# Patient Record
Sex: FEMALE | Race: WHITE | NOT HISPANIC OR LATINO | Employment: FULL TIME | URBAN - METROPOLITAN AREA
[De-identification: names, ages, dates, MRNs, and addresses within clinical notes are randomized per-mention and may not be internally consistent; named-entity substitution may affect disease eponyms.]

---

## 2021-01-15 DIAGNOSIS — Z23 ENCOUNTER FOR IMMUNIZATION: ICD-10-CM

## 2021-02-10 ENCOUNTER — IMMUNIZATIONS (OUTPATIENT)
Dept: FAMILY MEDICINE CLINIC | Facility: HOSPITAL | Age: 31
End: 2021-02-10

## 2021-02-10 DIAGNOSIS — Z23 ENCOUNTER FOR IMMUNIZATION: Primary | ICD-10-CM

## 2021-02-10 PROCEDURE — 91300 SARS-COV-2 / COVID-19 MRNA VACCINE (PFIZER-BIONTECH) 30 MCG: CPT

## 2021-02-10 PROCEDURE — 0001A SARS-COV-2 / COVID-19 MRNA VACCINE (PFIZER-BIONTECH) 30 MCG: CPT

## 2021-03-05 ENCOUNTER — IMMUNIZATIONS (OUTPATIENT)
Dept: FAMILY MEDICINE CLINIC | Facility: HOSPITAL | Age: 31
End: 2021-03-05

## 2021-03-05 DIAGNOSIS — Z23 ENCOUNTER FOR IMMUNIZATION: Primary | ICD-10-CM

## 2021-03-05 PROCEDURE — 91300 SARS-COV-2 / COVID-19 MRNA VACCINE (PFIZER-BIONTECH) 30 MCG: CPT

## 2021-03-05 PROCEDURE — 0002A SARS-COV-2 / COVID-19 MRNA VACCINE (PFIZER-BIONTECH) 30 MCG: CPT

## 2021-03-09 PROBLEM — I83.819 PAIN DUE TO VARICOSE VEINS OF LOWER EXTREMITY: Status: ACTIVE | Noted: 2021-03-09

## 2021-03-09 PROBLEM — G47.33 OBSTRUCTIVE SLEEP APNEA SYNDROME: Status: ACTIVE | Noted: 2021-03-09

## 2021-03-09 PROBLEM — J30.9 ALLERGIC RHINITIS: Status: ACTIVE | Noted: 2021-03-09

## 2021-03-09 PROBLEM — F41.8 MIXED ANXIETY AND DEPRESSIVE DISORDER: Status: ACTIVE | Noted: 2021-03-09

## 2021-03-09 PROBLEM — E78.2 MIXED HYPERLIPIDEMIA: Status: ACTIVE | Noted: 2021-03-09

## 2021-03-09 PROBLEM — E66.01 MORBID OBESITY (HCC): Status: ACTIVE | Noted: 2021-03-09

## 2021-03-09 NOTE — PROGRESS NOTES
Assessment/Plan   Diagnoses and all orders for this visit:    Well woman exam  -     Hemoglobin Electrophoresis; Future  -     Liquid-based pap, screening    Oligomenorrhea, unspecified type  -     Antimullerian hormone (AMH); Future  -     Comprehensive metabolic panel; Future  -     DHEA-sulfate; Future  -     Follicle stimulating hormone; Future  -     Luteinizing hormone; Future  -     Testosterone, free, total; Future  -     Insulin, fasting; Future  -     T4, free; Future  -     TSH, 3rd generation; Future  -     Estradiol; Future  -     Progesterone; Future  -     hCG, quantitative; Future  -     HEMOGLOBIN A1C W/ EAG ESTIMATION; Future    Screening for genetic disease carrier status  -     SMA Carrier Screen; Future  -     Cystic fibrosis gene test; Future    Other orders  -     DULoxetine HCl 40 MG CPEP  -     cetirizine (ZyrTEC) 10 mg tablet; Take 10 mg by mouth daily        Discussion    All questions have been answered to her satisfaction  RTO for APE or sooner if needed      Subjective     HPI   Charu Brennan is a 27 y o  female who presents for annual well woman exam    Menarche - 15; LMP -12/20/20   Periods q  days  Will occas have a run of normal periods then will miss a few mths  Periods very heavy and crampy 1-2 days Last 5 days  Has used Provera previously for withdrawal bleeds  I r/with pt risks of endometrial thickeneing and uterine cancer with long term oligomenorrhea with >90 days in between cycles  Was previously given diagnosis of PCOS years ago but was not a fan of her previous doc and did not feel like she had diagnosis explained to her very well  We reviewed diagnostic criteria for PCOS and that it is a syndrome, not a disease and that not everryone has the same components  Pt does admit to hirsutism and acne  Pt and  just stopped using BC last month  They do desire pregnancy  No vulvar itch/burn; No vaginal itch/burn; No abn discharge or odor;  No urinary sx - burning/pain/frequency/hematuria    (+) SBEs - no breast masses, asymmetry, nipple discharge or bleeding, changes in skin of breast, or breast tenderness bilaterally    No abd/pelvic pain or HAs;     Pt is sexually active in a mutually monog/ sexual relationship-1 lifetime partner; No issues with intercourse; She declines std/hiv/hep testing; Feels safe at home      (+) PCP for routine Bw/care; Last Pap - 2017   History of abnormal Pap smear: denies     Review of Systems   Constitutional: Negative  Respiratory: Negative  Gastrointestinal: Negative  Endocrine: Negative  Genitourinary: Positive for menstrual problem  The following portions of the patient's history were reviewed and updated as appropriate: allergies, current medications, past family history, past medical history, past social history, past surgical history and problem list          OB History    No obstetric history on file  Past Medical History:   Diagnosis Date    Asthma 2000    allergies    Depression 2005    Migraine 2003    Polycystic ovary syndrome 2012       History reviewed  No pertinent surgical history      Family History   Problem Relation Age of Onset    Asthma Mother    Mitali Fredi Migraines Mother    [address] [place] Mother     Osteoarthritis Mother     Osteoporosis Mother     Breast cancer Paternal Grandmother        Social History     Socioeconomic History    Marital status: /Civil Union     Spouse name: Not on file    Number of children: Not on file    Years of education: Not on file    Highest education level: Not on file   Occupational History    Not on file   Social Needs    Financial resource strain: Not on file    Food insecurity     Worry: Not on file     Inability: Not on file   [employer] needs     Medical: Not on file     Non-medical: Not on file   Tobacco Use    Smoking status: Never Smoker    Smokeless tobacco: Never Used   Substance and Sexual Activity    Alcohol use: Yes     Alcohol/week: 2 0 standard drinks     Types: 2 Glasses of wine per week    Drug use: Never    Sexual activity: Yes     Partners: Male     Birth control/protection: Coitus interruptus, Condom Male   Lifestyle    Physical activity     Days per week: Not on file     Minutes per session: Not on file    Stress: Not on file   Relationships    Social connections     Talks on phone: Not on file     Gets together: Not on file     Attends Denominational service: Not on file     Active member of club or organization: Not on file     Attends meetings of clubs or organizations: Not on file     Relationship status: Not on file    Intimate partner violence     Fear of current or ex partner: Not on file     Emotionally abused: Not on file     Physically abused: Not on file     Forced sexual activity: Not on file   Other Topics Concern    Not on file   Social History Narrative    Not on file         Current Outpatient Medications:     cetirizine (ZyrTEC) 10 mg tablet, Take 10 mg by mouth daily, Disp: , Rfl:     DULoxetine HCl 40 MG CPEP, , Disp: , Rfl:     No Known Allergies    Objective   Vitals:    03/10/21 0842   BP: 136/82   BP Location: Left arm   Patient Position: Sitting   Cuff Size: Standard   Weight: 121 kg (267 lb)   Height: 5' 4" (1 626 m)     Physical Exam  Constitutional:       Appearance: She is well-developed  HENT:      Head: Normocephalic and atraumatic  Cardiovascular:      Rate and Rhythm: Normal rate and regular rhythm  Pulmonary:      Effort: Pulmonary effort is normal       Breath sounds: Normal breath sounds  Chest:      Breasts: Breasts are symmetrical          Right: No inverted nipple, mass, nipple discharge, skin change or tenderness  Left: No inverted nipple, mass, nipple discharge, skin change or tenderness  Abdominal:      General: There is no distension  Palpations: Abdomen is soft  There is no mass  Tenderness: There is no guarding or rebound  Genitourinary:     Exam position: Supine  Labia:         Right: No rash  Left: No rash  Vagina: No signs of injury and foreign body  No vaginal discharge or erythema  Cervix: No cervical motion tenderness  Adnexa:         Right: No mass  Left: No mass  Rectum: Guaiac result negative  Skin:     General: Skin is warm and dry  Neurological:      Mental Status: She is alert and oriented to person, place, and time  Patient Instructions   Pap done  Declines STD work up  At this point will plan fasting labs  If no evidence of ovulation, will plan Provera withdrawal    Will then plan day 3 labs  Pt and  just began to try for pregnancy, likely not ready for any ovulation induction meds yet  At the end of the appt pt did mention that her mom has a h/o + anticardiolipin antibodies  She had a still born child  Will plan to add KASSIDY antibodies in with her day 3 labs once her cycle comes on

## 2021-03-10 ENCOUNTER — OFFICE VISIT (OUTPATIENT)
Dept: OBGYN CLINIC | Facility: CLINIC | Age: 31
End: 2021-03-10
Payer: COMMERCIAL

## 2021-03-10 VITALS
HEIGHT: 64 IN | SYSTOLIC BLOOD PRESSURE: 136 MMHG | BODY MASS INDEX: 45.58 KG/M2 | WEIGHT: 267 LBS | DIASTOLIC BLOOD PRESSURE: 82 MMHG

## 2021-03-10 DIAGNOSIS — N91.5 OLIGOMENORRHEA, UNSPECIFIED TYPE: ICD-10-CM

## 2021-03-10 DIAGNOSIS — Z13.71 SCREENING FOR GENETIC DISEASE CARRIER STATUS: ICD-10-CM

## 2021-03-10 DIAGNOSIS — Z01.419 WELL WOMAN EXAM: Primary | ICD-10-CM

## 2021-03-10 PROCEDURE — 3008F BODY MASS INDEX DOCD: CPT | Performed by: PHYSICIAN ASSISTANT

## 2021-03-10 PROCEDURE — 99385 PREV VISIT NEW AGE 18-39: CPT | Performed by: PHYSICIAN ASSISTANT

## 2021-03-10 PROCEDURE — 87624 HPV HI-RISK TYP POOLED RSLT: CPT | Performed by: PHYSICIAN ASSISTANT

## 2021-03-10 PROCEDURE — G0145 SCR C/V CYTO,THINLAYER,RESCR: HCPCS | Performed by: PHYSICIAN ASSISTANT

## 2021-03-10 RX ORDER — CETIRIZINE HYDROCHLORIDE 10 MG/1
10 TABLET ORAL DAILY
COMMUNITY

## 2021-03-10 RX ORDER — DULOXETINE 40 MG/1
CAPSULE, DELAYED RELEASE ORAL
COMMUNITY
Start: 2020-04-01 | End: 2021-05-28 | Stop reason: ALTCHOICE

## 2021-03-10 NOTE — PATIENT INSTRUCTIONS
Pap done  Declines STD work up  At this point will plan fasting labs  If no evidence of ovulation, will plan Provera withdrawal    Will then plan day 3 labs  Pt and  just began to try for pregnancy, likely not ready for any ovulation induction meds yet  At the end of the appt pt did mention that her mom has a h/o + anticardiolipin antibodies  She had a still born child  Will plan to add KASSIDY antibodies in with her day 3 labs once her cycle comes on

## 2021-03-12 LAB
HPV HR 12 DNA CVX QL NAA+PROBE: NEGATIVE
HPV16 DNA CVX QL NAA+PROBE: NEGATIVE
HPV18 DNA CVX QL NAA+PROBE: NEGATIVE

## 2021-03-16 LAB
ALBUMIN SERPL-MCNC: 3.8 G/DL (ref 3.6–5.1)
ALBUMIN/GLOB SERPL: 1.6 (CALC) (ref 1–2.5)
ALP SERPL-CCNC: 73 U/L (ref 31–125)
ALT SERPL-CCNC: 18 U/L (ref 6–29)
AST SERPL-CCNC: 14 U/L (ref 10–30)
B-HCG SERPL-ACNC: <3 MIU/ML
BILIRUB SERPL-MCNC: 0.4 MG/DL (ref 0.2–1.2)
BUN SERPL-MCNC: 11 MG/DL (ref 7–25)
BUN/CREAT SERPL: ABNORMAL (CALC) (ref 6–22)
CALCIUM SERPL-MCNC: 8.7 MG/DL (ref 8.6–10.2)
CHLORIDE SERPL-SCNC: 103 MMOL/L (ref 98–110)
CO2 SERPL-SCNC: 27 MMOL/L (ref 20–32)
CREAT SERPL-MCNC: 0.72 MG/DL (ref 0.5–1.1)
DHEA-S SERPL-MCNC: 261 MCG/DL (ref 18–391)
EST. AVERAGE GLUCOSE BLD GHB EST-MCNC: 97 (CALC)
EST. AVERAGE GLUCOSE BLD GHB EST-SCNC: 5.4 (CALC)
ESTRADIOL SERPL-MCNC: 51 PG/ML
FSH SERPL-ACNC: 6.2 MIU/ML
GLOBULIN SER CALC-MCNC: 2.4 G/DL (CALC) (ref 1.9–3.7)
GLUCOSE SERPL-MCNC: 102 MG/DL (ref 65–99)
HBA1C MFR BLD: 5 % OF TOTAL HGB
INSULIN SERPL-ACNC: 21 UIU/ML
LAB AP GYN PRIMARY INTERPRETATION: NORMAL
LH SERPL-ACNC: 9 MIU/ML
Lab: NORMAL
MIS SERPL-MCNC: 3.67 NG/ML (ref 0.69–13.39)
POTASSIUM SERPL-SCNC: 4.5 MMOL/L (ref 3.5–5.3)
PROGEST SERPL-MCNC: 0.7 NG/ML
PROT SERPL-MCNC: 6.2 G/DL (ref 6.1–8.1)
SL AMB EGFR AFRICAN AMERICAN: 130 ML/MIN/1.73M2
SL AMB EGFR NON AFRICAN AMERICAN: 112 ML/MIN/1.73M2
SODIUM SERPL-SCNC: 138 MMOL/L (ref 135–146)
T4 FREE SERPL-MCNC: 0.8 NG/DL (ref 0.8–1.8)
TESTOST FREE SERPL-MCNC: 5.9 PG/ML (ref 0.1–6.4)
TESTOST SERPL-MCNC: 28 NG/DL (ref 2–45)
TSH SERPL-ACNC: 1.4 MIU/L

## 2021-03-17 DIAGNOSIS — N91.4 SECONDARY OLIGOMENORRHEA: Primary | ICD-10-CM

## 2021-03-17 RX ORDER — MEDROXYPROGESTERONE ACETATE 10 MG/1
10 TABLET ORAL DAILY
Qty: 10 TABLET | Refills: 0 | Status: SHIPPED | OUTPATIENT
Start: 2021-03-17 | End: 2021-05-28 | Stop reason: SDUPTHER

## 2021-03-31 ENCOUNTER — TELEPHONE (OUTPATIENT)
Dept: OBGYN CLINIC | Facility: CLINIC | Age: 31
End: 2021-03-31

## 2021-03-31 NOTE — TELEPHONE ENCOUNTER
I would have her finish provera if she's just spotting at this point  If bleeding picks up she can stop it and then plan day 3 labs 3 days after the first day of regular flow

## 2021-03-31 NOTE — TELEPHONE ENCOUNTER
Pt called to confirm when to complete BW  Pt started spotting today and started taking Provera 10 mg 3/24/21 still has two days left, have not miss a dose  Spotting is very light - here and there  0

## 2021-04-06 ENCOUNTER — TELEPHONE (OUTPATIENT)
Dept: OBGYN CLINIC | Facility: CLINIC | Age: 31
End: 2021-04-06

## 2021-04-06 NOTE — TELEPHONE ENCOUNTER
LMOM with details to Review with pt first day of full bleed is considered day one  Sunday pt had spotting and yesterday 4/5/21 was a full bleed

## 2021-04-08 LAB
ESTRADIOL SERPL-MCNC: 42 PG/ML
FSH SERPL-ACNC: 6.9 MIU/ML
LH SERPL-ACNC: 6.7 MIU/ML
PROGEST SERPL-MCNC: <0.5 NG/ML

## 2021-05-28 ENCOUNTER — OFFICE VISIT (OUTPATIENT)
Dept: FAMILY MEDICINE CLINIC | Facility: CLINIC | Age: 31
End: 2021-05-28
Payer: COMMERCIAL

## 2021-05-28 VITALS
TEMPERATURE: 97.6 F | OXYGEN SATURATION: 98 % | RESPIRATION RATE: 14 BRPM | DIASTOLIC BLOOD PRESSURE: 86 MMHG | BODY MASS INDEX: 46.06 KG/M2 | HEART RATE: 86 BPM | HEIGHT: 64 IN | SYSTOLIC BLOOD PRESSURE: 120 MMHG | WEIGHT: 269.8 LBS

## 2021-05-28 DIAGNOSIS — E66.01 CLASS 3 SEVERE OBESITY DUE TO EXCESS CALORIES WITHOUT SERIOUS COMORBIDITY WITH BODY MASS INDEX (BMI) OF 45.0 TO 49.9 IN ADULT (HCC): ICD-10-CM

## 2021-05-28 DIAGNOSIS — F41.8 MIXED ANXIETY AND DEPRESSIVE DISORDER: ICD-10-CM

## 2021-05-28 DIAGNOSIS — R13.10 DYSPHAGIA, UNSPECIFIED TYPE: ICD-10-CM

## 2021-05-28 DIAGNOSIS — E88.81 INSULIN RESISTANCE: Primary | ICD-10-CM

## 2021-05-28 DIAGNOSIS — E78.2 MIXED HYPERLIPIDEMIA: ICD-10-CM

## 2021-05-28 PROCEDURE — 3008F BODY MASS INDEX DOCD: CPT | Performed by: FAMILY MEDICINE

## 2021-05-28 PROCEDURE — 1036F TOBACCO NON-USER: CPT | Performed by: FAMILY MEDICINE

## 2021-05-28 PROCEDURE — 99204 OFFICE O/P NEW MOD 45 MIN: CPT | Performed by: FAMILY MEDICINE

## 2021-05-28 PROCEDURE — 3725F SCREEN DEPRESSION PERFORMED: CPT | Performed by: FAMILY MEDICINE

## 2021-05-28 RX ORDER — METFORMIN HYDROCHLORIDE 500 MG/1
500 TABLET, EXTENDED RELEASE ORAL
Qty: 30 TABLET | Refills: 3 | Status: SHIPPED | OUTPATIENT
Start: 2021-05-28 | End: 2021-08-24 | Stop reason: ALTCHOICE

## 2021-05-28 RX ORDER — DULOXETIN HYDROCHLORIDE 20 MG/1
40 CAPSULE, DELAYED RELEASE ORAL DAILY
Start: 2021-05-28 | End: 2021-07-21 | Stop reason: SDUPTHER

## 2021-05-28 RX ORDER — DULOXETIN HYDROCHLORIDE 20 MG/1
20 CAPSULE, DELAYED RELEASE ORAL DAILY
COMMUNITY
Start: 2021-05-14 | End: 2021-05-28 | Stop reason: SDUPTHER

## 2021-05-28 RX ORDER — MEDROXYPROGESTERONE ACETATE 5 MG/1
10 TABLET ORAL DAILY
COMMUNITY
Start: 2021-03-17 | End: 2021-07-20

## 2021-05-28 NOTE — PROGRESS NOTES
Assessment/Plan:    No problem-specific Assessment & Plan notes found for this encounter  Diagnoses and all orders for this visit:    Insulin resistance  Comments:  discussed options  start metformin xr 500mg daily  pursue medically managed wieght loss  Orders:  -     metFORMIN (GLUCOPHAGE-XR) 500 mg 24 hr tablet; Take 1 tablet (500 mg total) by mouth daily with breakfast  -     Ambulatory referral to Weight Management; Future    Dysphagia, unspecified type  Comments:  refer to GI  needs endoscopy  Orders:  -     Ambulatory referral to Gastroenterology; Future    Class 3 severe obesity due to excess calories without serious comorbidity with body mass index (BMI) of 45 0 to 49 9 in [facility])  Comments:  start metformin for insulin resistance and because she desires pregnancy  refer to medically managed weight loss  Orders:  -     Ambulatory referral to Weight Management; Future    Mixed anxiety and depressive disorder  Comments:  pt reports doing well with cymbalta 40mg   continue same    Orders:  -     DULoxetine (CYMBALTA) 20 mg capsule; Take 2 capsules (40 mg total) by mouth daily 2 capsules    Mixed hyperlipidemia  Comments:  ldl high, trigs high, hdl low  no role for statin at present  weight loss will help this issue    Other orders  -     Discontinue: DULoxetine (CYMBALTA) 20 mg capsule; Take 20 mg by mouth daily 2 capsules  -     medroxyPROGESTERone (PROVERA) 5 mg tablet; Take 10 mg by mouth daily        Subjective:      Patient ID: Emmanuel Torres is a 27 y o  female  HPI   Pt presents as new pt  Trying and failing to lose weight  Struggling with infertility and ? Of PCOS  Recent fasting insulin elevated  Seeing gyn    Lipids from 12/20 showed tchol 262, hdl 41, trigs 177, ldl 186    cmp shows glucose 102 but otherwise unremarkable  nml thyroid fxn  Pt states she has a hx of dysphagia for the last 6 yrs    Food sometimes gets stuck when she is swallowing and she has to focus at times to swallow  Had swallowing study in the past which was normal   Never had EGD    Pt has hx of anxiety and depression  She feels like cymbalta 40mg daily is really helpful in this regard  Feels optimized at present  The following portions of the patient's history were reviewed and updated as appropriate: allergies, current medications, past family history, past medical history, past social history, past surgical history and problem list     Review of Systems   Constitutional: Negative for chills, fatigue, fever and unexpected weight change  HENT: Positive for trouble swallowing  Negative for congestion, ear pain, hearing loss, postnasal drip, rhinorrhea, sinus pressure, sinus pain, sore throat and voice change  Eyes: Negative for pain, redness and visual disturbance  Respiratory: Negative for cough and shortness of breath  Cardiovascular: Negative for chest pain, palpitations and leg swelling  Gastrointestinal: Negative for abdominal pain, constipation, diarrhea and nausea  Endocrine: Negative for cold intolerance, heat intolerance, polydipsia and polyuria  Genitourinary: Negative for dysuria, frequency and urgency  Musculoskeletal: Negative for arthralgias, joint swelling and myalgias  Skin: Negative for rash  No suspicious lesions   Neurological: Negative for weakness, numbness and headaches  Hematological: Negative for adenopathy  Objective:      /86   Pulse 86   Temp 97 6 °F (36 4 °C)   Resp 14   Ht 5' 4" (1 626 m)   Wt 122 kg (269 lb 12 8 oz)   SpO2 98%   BMI 46 31 kg/m²          Physical Exam  Constitutional:       General: She is not in acute distress  Appearance: She is well-developed  She is obese  HENT:      Head: Normocephalic and atraumatic        Right Ear: Tympanic membrane, ear canal and external ear normal       Left Ear: Tympanic membrane, ear canal and external ear normal       Nose: Nose normal       Mouth/Throat:      Pharynx: No oropharyngeal exudate  Eyes:      Conjunctiva/sclera: Conjunctivae normal       Pupils: Pupils are equal, round, and reactive to light  Neck:      Thyroid: No thyromegaly  Vascular: No carotid bruit or JVD  Cardiovascular:      Rate and Rhythm: Regular rhythm  Heart sounds: S1 normal and S2 normal  No murmur  No friction rub  No gallop  No S3 or S4 sounds  Pulmonary:      Effort: Pulmonary effort is normal       Breath sounds: Normal breath sounds  No wheezing, rhonchi or rales  Abdominal:      General: Bowel sounds are normal  There is no distension  Palpations: Abdomen is soft  Tenderness: There is no abdominal tenderness  Lymphadenopathy:      Cervical: No cervical adenopathy  Neurological:      Mental Status: She is alert and oriented to person, place, and time  Cranial Nerves: No cranial nerve deficit  Sensory: No sensory deficit  Deep Tendon Reflexes: Reflexes are normal and symmetric  BMI Counseling: Body mass index is 46 31 kg/m²  The BMI is above normal  Nutrition recommendations include encouraging healthy choices of fruits and vegetables  Exercise recommendations include exercising 3-5 times per week  Pharmacotherapy was ordered to help aid in weight loss  Patient referred to weight management due to patient being overweight

## 2021-07-20 ENCOUNTER — TELEPHONE (OUTPATIENT)
Dept: OBGYN CLINIC | Facility: CLINIC | Age: 31
End: 2021-07-20

## 2021-07-20 ENCOUNTER — CONSULT (OUTPATIENT)
Dept: BARIATRICS | Facility: CLINIC | Age: 31
End: 2021-07-20
Payer: COMMERCIAL

## 2021-07-20 VITALS
DIASTOLIC BLOOD PRESSURE: 90 MMHG | RESPIRATION RATE: 12 BRPM | HEART RATE: 85 BPM | WEIGHT: 267.4 LBS | TEMPERATURE: 100.3 F | SYSTOLIC BLOOD PRESSURE: 120 MMHG | BODY MASS INDEX: 44.55 KG/M2 | HEIGHT: 65 IN

## 2021-07-20 DIAGNOSIS — E28.2 PCOS (POLYCYSTIC OVARIAN SYNDROME): ICD-10-CM

## 2021-07-20 DIAGNOSIS — G47.33 OBSTRUCTIVE SLEEP APNEA SYNDROME: ICD-10-CM

## 2021-07-20 DIAGNOSIS — F41.8 MIXED ANXIETY AND DEPRESSIVE DISORDER: ICD-10-CM

## 2021-07-20 DIAGNOSIS — E66.01 MORBID OBESITY (HCC): ICD-10-CM

## 2021-07-20 DIAGNOSIS — E78.2 MIXED HYPERLIPIDEMIA: ICD-10-CM

## 2021-07-20 DIAGNOSIS — E88.81 INSULIN RESISTANCE: ICD-10-CM

## 2021-07-20 DIAGNOSIS — E66.01 OBESITY, CLASS III, BMI 40-49.9 (MORBID OBESITY) (HCC): Primary | ICD-10-CM

## 2021-07-20 PROCEDURE — 1036F TOBACCO NON-USER: CPT | Performed by: PHYSICIAN ASSISTANT

## 2021-07-20 PROCEDURE — 99204 OFFICE O/P NEW MOD 45 MIN: CPT | Performed by: PHYSICIAN ASSISTANT

## 2021-07-20 NOTE — PROGRESS NOTES
Assessment/Plan: Morbid obesity ([facility] )  -Discussed options of HealthyCORE-Intensive Lifestyle Intervention Program, Very Low Calorie Diet-VLCD, Conservative Program, Heather-En-Y Gastric Bypass and Vertical Sleeve Gastrectomy and the role of weight loss medications   -Initial weight loss goal of 5-10% weight loss for improved health  -Screening labs: reviewed  -Patient is interested in pursuing Conservative Program and bundle with RD  Sample meal replacements given as well    Mixed anxiety and depressive disorder  -reports well controlled on Cymbalta    Mixed hyperlipidemia  Update lipid panel    PCOS (polycystic ovarian syndrome)  -being evaluated by GYN  -recently started on Metformin by PCP for insulin resistance  -dicussed important of dietary/lifestyle changes  -can consider increase metformin/addition of GLP-1  Patient defers increase of metformin dose at this time    Obstructive sleep apnea syndrome  -mild per patient report and mouth piece was recommended  -advise tx of this  -discussed risks of untreated ANJALI and difficulty with weight loss if not treated    Goals:    Food log (ie ) www myfitnesspal com,sparkpeople  com,loseit com,calorieking  com,etc    No sugary beverages  At least 64oz of water daily  Increase physical activity by 10 minutes daily  Gradually increase physical activity to a goal of 5 days per week for 30 minutes of MODERATE intensity PLUS 2 days per week of FULL BODY resistance training    Follow up in approximately 1 week with Non-Surgical Dietician and 6-8 weeks with PA-C    Diagnoses and all orders for this visit:    Obesity, Class III, BMI 40-49 9 (morbid obesity) ([facility] )  -     Lipid panel;  Future    BMI 45 0-49 9, adult ([facility])  Comments:  start metformin for insulin resistance and because she desires pregnancy  refer to medically managed weight loss  Orders:  -     Ambulatory referral to Weight Management    Insulin resistance  Comments:  discussed options  start metformin xr 500mg daily  pursue medically managed wieght loss  Orders:  -     Ambulatory referral to Weight Management  -     Lipid panel; Future    Morbid obesity ([facility])    Mixed anxiety and depressive disorder    Mixed hyperlipidemia    PCOS (polycystic ovarian syndrome)    Obstructive sleep apnea syndrome    Other orders  -     Prenatal Vit-DSS-Fe Cbn-FA (PRENATAL AD PO); Take by mouth          Subjective:   Chief Complaint   Patient presents with    Consult     for MWM        Patient ID: Trevor Travis  is a 27 y o  female with excess weight/obesity here to pursue weight managment  Past Medical History:   Diagnosis Date    Asthma 2000    allergies    Depression 2005    Migraine 2003    Morbid obesity with BMI of 45 0-49 9, adult ([facility] )     Polycystic ovary syndrome 2012         HPI:  Obesity/Excess Weight:  Severity: Severe  Onset:  Age 7  Modifiers: personal training at gym, interval eating  Contributing factors: traving with work in last year, feeling discouraged about her weight  Associated symptoms: increased snoring, doesn't feel comfortable, hard to fit in seat on airplane     Goals: under 200 lbs, improve health  Highest: current    Hydration: water 30-40oz; coffee + creamer  Exercise: walking 2-3x per week  Occupation: works in Healthcare; sedentary job  [place] [employer] a lot for work  Sleep: 8-9 hours  ETOH: 2-3 drinks per week    Started Metformin in May 2021  Mild sleep apnea tested at [facility]  Recommended treatment was mouth piece     Colonoscopy: N/A    The following portions of the patient's history were reviewed and updated as appropriate: allergies, current medications, past family history, past medical history, past social history, past surgical history and problem list     Review of Systems   Constitutional: Negative for chills and fever  HENT: Negative for sore throat  Respiratory: Negative for cough and shortness of breath  Cardiovascular: Negative for chest pain and palpitations  Gastrointestinal: Positive for vomiting (when has migraine)  Negative for abdominal pain and nausea  Genitourinary: Negative for dysuria  Musculoskeletal: Negative for arthralgias  Skin: Negative for rash  Neurological: Positive for headaches ( migraine - denies food triggers)  Psychiatric/Behavioral: The patient is nervous/anxious  Reports mood is improved with antidepressant       Objective:    /90   Pulse 85   Temp 100 3 °F (37 9 °C)   Resp 12   Ht 5' 4 5" (1 638 m)   Wt 121 kg (267 lb 6 4 oz)   BMI 45 19 kg/m²     Physical Exam  Vitals and nursing note reviewed  Constitutional   General appearance: Abnormal   well developed and morbidly obese  Eyes No conjunctival pallor  Ears, Nose, Mouth, and Throat Oral mucosa moist    Pulmonary   Respiratory effort: No increased work of breathing or signs of respiratory distress  Auscultation of lungs: Clear to auscultation, equal breath sounds bilaterally, no wheezes, no rales, no rhonci  Cardiovascular   Auscultation of heart: Normal rate and rhythm, normal S1 and S2, without murmurs  Examination of extremities for edema and/or varicosities:  no edema, + varicosities   Abdomen   Abdomen: Abnormal   The abdomen was obese  Bowel sounds were normal  The abdomen was soft and nontender     Musculoskeletal   Gait and station: Normal     Psychiatric   Orientation to person, place and time: Normal     Affect: appropriate

## 2021-07-20 NOTE — ASSESSMENT & PLAN NOTE
-mild per patient report and mouth piece was recommended  -advise tx of this  -discussed risks of untreated ANJALI and difficulty with weight loss if not treated

## 2021-07-20 NOTE — ASSESSMENT & PLAN NOTE
-being evaluated by GYN  -recently started on Metformin by PCP for insulin resistance  -dicussed important of dietary/lifestyle changes  -can consider increase metformin/addition of GLP-1   Patient defers increase of metformin dose at this time

## 2021-07-20 NOTE — ASSESSMENT & PLAN NOTE
-Discussed options of HealthyCORE-Intensive Lifestyle Intervention Program, Very Low Calorie Diet-VLCD, Conservative Program, Heather-En-Y Gastric Bypass and Vertical Sleeve Gastrectomy and the role of weight loss medications   -Initial weight loss goal of 5-10% weight loss for improved health  -Screening labs: reviewed  -Patient is interested in pursuing Conservative Program and bundle with RD   Sample meal replacements given as well

## 2021-07-21 DIAGNOSIS — F41.8 MIXED ANXIETY AND DEPRESSIVE DISORDER: ICD-10-CM

## 2021-07-21 RX ORDER — DULOXETIN HYDROCHLORIDE 20 MG/1
40 CAPSULE, DELAYED RELEASE ORAL DAILY
Qty: 180 CAPSULE | Refills: 1 | Status: SHIPPED | OUTPATIENT
Start: 2021-07-21 | End: 2022-01-23 | Stop reason: SDUPTHER

## 2021-07-21 NOTE — TELEPHONE ENCOUNTER
Medication refill requested: Cymbalta   Last office visit: 5/28/21  Next office visit: None  Last refilled: 5/28/21  Pharmacy :   [facility]  [address] [place] [address]  Phone: [phone] Fax: [phone]       Pended: 180 x 1

## 2021-07-22 ENCOUNTER — TELEPHONE (OUTPATIENT)
Dept: BARIATRICS | Facility: CLINIC | Age: 31
End: 2021-07-22

## 2021-07-27 ENCOUNTER — HOSPITAL ENCOUNTER (OUTPATIENT)
Dept: ULTRASOUND IMAGING | Facility: HOSPITAL | Age: 31
Discharge: HOME | End: 2021-07-27
Payer: COMMERCIAL

## 2021-07-27 DIAGNOSIS — N91.4 SECONDARY OLIGOMENORRHEA: ICD-10-CM

## 2021-07-27 PROCEDURE — 76856 US EXAM PELVIC COMPLETE: CPT

## 2021-07-27 PROCEDURE — 76830 TRANSVAGINAL US NON-OB: CPT

## 2021-07-28 ENCOUNTER — OFFICE VISIT (OUTPATIENT)
Dept: BARIATRICS | Facility: CLINIC | Age: 31
End: 2021-07-28

## 2021-07-28 VITALS — WEIGHT: 268.7 LBS | BODY MASS INDEX: 44.77 KG/M2 | HEIGHT: 65 IN

## 2021-07-28 DIAGNOSIS — R63.5 ABNORMAL WEIGHT GAIN: Primary | ICD-10-CM

## 2021-07-28 PROCEDURE — RECHECK

## 2021-07-28 PROCEDURE — 3008F BODY MASS INDEX DOCD: CPT | Performed by: PHYSICIAN ASSISTANT

## 2021-07-28 PROCEDURE — DB6PK

## 2021-07-28 NOTE — PROGRESS NOTES
Weight Management Medical Nutrition Assessment  Pt presented for RD BUNDLE 1/6  Today's weight is 268 7#  She is motivated to lose weight to try to get pregnant and feel better  She also struggles when she is traveling for work, sometimes away for weeks at a time  Per dietary recall, she only eats 1-2x/day  Explained that she needs to eat in intervals and increase protein intake  She doesn't drink enough water and doesn't exercise either  She mentioned that she has high cholesterol, reviewed nutritionally what foods to eat to lower cholesterol  Customized meal plan created  Recommended 8221-6145 kcal/day, weighing/measuring food, using food tracker, adequate fluids and incorporating exercise regimen  Will return in 3 weeks          Patient seen by Medical Provider in past 6 months:  yes  Requested to schedule appointment with Medical Provider: No      Anthropometric Measurements  Start Weight (#): 268 7# (7/28/2021)  Current Weight (#): n/a  TBW % Change from start weight: n/a  Ideal Body Weight (#): 122#  Goal Weight (#): <200#    Weight Loss History  Previous weight loss attempts: Counseling with  MD  Exercise  Meal Replacements (Medifast, Slim Fast, etc )  Self Created Diets (Portion Control, Healthy Food Choices, etc )    Food and Nutrition Related History  Wake up: 8 AM   Bed Time: 11PM    Food Recall  Breakfast: (8:30AM): coffee with creamer   Snack: ---  Lunch: (2-3 PM): sandwich with pasta salad  Snack: ---  Dinner: (8PM): meatloaf + green beans; spaghetti + chicken parm; tomato sandwiches; sometimes popcorn for dinner  Snack: --      Beverages: water and coffee/tea  Volume of beverage intake: 32 oz water; 1 cup coffee with creamer    Weekends: Worse, socializing, going out to eat, staying in drinking/snacking  Cravings: salty foods/friend foods  Trouble area of day: none    Frequency of Eating out: traveling for work (then eating out constantly)  Food restrictions: dairy, does have cheese  Cooking: self Food Shopping: self    Physical Activity Intake  Activity: NONE - has gym at apartment complex   Frequency:n/a  Physical limitations/barriers to exercise: none    Estimated Needs  Energy  [employer] [place] Energy Needs: BMR : 1932 kcal     1-2# loss weekly sedentary:  0513-2329 kcal               1-2# loss weekly lightly active: 9227-2709 kcal  Maintenance calories for sedentary activity level: 2318 kcal  Protein: 66- 83 gram      (1 2-1 5g/kg IBW)  Fluid: 1941 ml     (35mL/kg IBW)    Nutrition Diagnosis  Yes;     Overweight/obesity  related to Excess energy intake as evidenced by  BMI more than normative standard for age and sex (obesity-grade III 36+)       Nutrition Intervention    Nutrition Prescription  Calories: 2675-4340  Protein: 66-83 grams  Fluid: 65 oz    Meal Plan (Daren/Pro/Carb)  Breakfast: 380/20  Snack:---  Lunch: 300/30  Snack: 100-150/5-15  Dinner: 380/37  Snack: 200/5-15    Nutrition Education:    Healthy Core Manual  Calorie controlled menu  Lean protein food choices  Healthy snack options  Food journaling tips      Nutrition Counseling:  Strategies: meal planning, portion sizes, healthy snack choices, hydration, fiber intake, protein intake, exercise, food journal      Monitoring and Evaluation:  Evaluation criteria:  Energy Intake  Meet protein needs  Maintain adequate hydration  Monitor weekly weight  Meal planning/preparation  Food journal   Decreased portions at mealtimes and snacks  Physical activity     Barriers to learning:none  Readiness to change: Preparation:  (Getting ready to change)   Comprehension: very good  Expected Compliance: very good

## 2021-08-02 ENCOUNTER — TELEPHONE (OUTPATIENT)
Dept: OBGYN CLINIC | Facility: CLINIC | Age: 31
End: 2021-08-02

## 2021-08-02 NOTE — TELEPHONE ENCOUNTER
LMP 4/4/21, for day 3 BW she completed on 4/7/21  Pt did take Provera to get the period in April  Pt denies any spotting since the period in April  Pt would like to know what Ashley Shira recommends

## 2021-08-03 ENCOUNTER — TELEPHONE (OUTPATIENT)
Dept: OBGYN CLINIC | Facility: CLINIC | Age: 31
End: 2021-08-03

## 2021-08-03 NOTE — TELEPHONE ENCOUNTER
Pt called and said that she was calling back Kalpesh  She said that she wanted to talk to her about some symptoms she is experiencing and to review test results from an ultrasound  I told pt that she will receive a call back

## 2021-08-03 NOTE — TELEPHONE ENCOUNTER
Lmom to review results  US is also c/w PCOS  If no menses since April, I would advise repeat day 21 labs  If no evidence of ovulation or pregnancy, then would plan repeat Provera 10 mg x 10 days

## 2021-08-03 NOTE — TELEPHONE ENCOUNTER
Spoke w pt  Thoroughly reviewed US results and PCOS  We reviewed anovulation causing lack of cycles and no pregnancy  Will plan labs  If no signs of ovulation will plan Provera withdrawal    If insulin still elevated will also plan to increase Metformin as well  All questions answered  Pt currently working on weight loss

## 2021-08-09 ENCOUNTER — TELEPHONE (OUTPATIENT)
Dept: OBGYN CLINIC | Facility: CLINIC | Age: 31
End: 2021-08-09

## 2021-08-09 NOTE — TELEPHONE ENCOUNTER
Pt states she started her menstrual cycle Thursday evening light spotting, full bleed started Friday but still light bleeding  Pt is taking 500 mg Metformin with breakfast  Pt unsure what the next step is since she thought she was going to have to take the Provera to bring on her period  Pt aware Pamela Palma will review and we will contact her this afternoon

## 2021-08-09 NOTE — TELEPHONE ENCOUNTER
Since pt does not require Provera, should pt complete BW you ordered? For the BW pt will need to be fasting  Pt's day one would be considered Friday

## 2021-08-09 NOTE — TELEPHONE ENCOUNTER
Needs other labs like prolactin and insulin  Does need to ne fasting  No need for it to be done at a certain time in the month

## 2021-08-14 NOTE — PROGRESS NOTES
Weight Management Medical Nutrition Assessment  Pt presented for RD BUNDLE 2/6  Today's weight is 261 3#  She lost 7 4# since last visit  SECA scan administered and results reviewed  Copy given to patient and one placed in folder  She has been tracking her intake using myfitnesspal and notices that she is struggling to eat enough kcals  She has been eating ~4361-3349 kcal/day  Strongly encouraged reaching 1300 kcal/day  Reviewed some ways to increase kcals like adding avocado, olive oil to veggies after portioned out and nuts/seeds (will also help with cholesterol)  She is doing better with work traveling and eating at healthier places like [employer], [employer]  Continue to recommend 6062-4933 kcal/day (or at least 1300 kcal/day), weighing/measuring food, using food tracker, adequate fluids and walking  Will return in 1 month         She is motivated to lose weight to try to get pregnant and feel better         Patient seen by Medical Provider in past 6 months:  yes  Requested to schedule appointment with Medical Provider: No        Anthropometric Measurements  Start Weight (#): 268 7# (7/28/2021)  Current Weight (#): 261 3#  TBW % Change from start weight: 2 8%  Ideal Body Weight (#): 122#  Goal Weight (#): <200#     Weight Loss History  Previous weight loss attempts: Counseling with  MD  Exercise  Meal Replacements (Medifast, Slim Fast, etc )  Self Created Diets (Portion Control, Healthy Food Choices, etc )     Food and Nutrition Related History  Wake up: 8 AM             Bed Time: 11PM     Food Recall  Breakfast: (8:30AM): coffee with creamer (not always drinking coffee, but does use a lot of creamer); (9AM): premier protein shake + fruit (1/2 banana or strawberries)  Snack: ---  Lunch: (12 PM): leftovers: chicken + broccoli or brussels sprouts, salad + sometimes a starch; (tomato sandwich with 647 bread -no protein); salad at [employer] or [employer]  Snack:  (3-4PM): cottage cheese + blueberries OR yogurt   Dinner: (8PM): protein + veggies + starch (cous cous, rice pilaf; biscuit)   Snack: popcorn, yogurt or string cheese; outshine fruit bars        Beverages: water and coffee/tea  Volume of beverage intake: 48 oz water; 1 cup coffee with creamer     Weekends: Getting better (socializing more)   Cravings: cheese   Trouble area of day: none     Frequency of Eating out: traveling for work (then eating out constantly)  Food restrictions: dairy, does have cheese  Cooking: self   Food Shopping: self     Physical Activity Intake  Activity: sometimes swimming or walking but not consistent  Frequency: intermittent  Physical limitations/barriers to exercise: none     Estimated Needs  Energy  [facility] Energy Needs: BMR : 1898 kcal     1-2# loss weekly sedentary:  6591-8048 kcal               1-2# loss weekly lightly active: 4231-2875 kcal  Maintenance calories for sedentary activity level: 2278 kcal  Protein: 66- 83 gram      (1 2-1 5g/kg IBW)  Fluid: 1941 ml     (35mL/kg IBW)     Nutrition Diagnosis  Yes;     Overweight/obesity  related to Excess energy intake as evidenced by  BMI more than normative standard for age and sex (obesity-grade III 36+)     Nutrition Intervention     Nutrition Prescription  Calories: 0293-2382  Protein: 66-83 grams  Fluid: 65 oz     Meal Plan (Daren/Pro/Carb)  Breakfast: 380/20  Snack:---  Lunch: 300/30  Snack: 100-150/5-15  Dinner: 380/37  Snack: 200/5-15     Nutrition Education:    Healthy Core Manual  Calorie controlled menu  Lean protein food choices  Healthy snack options  Food journaling tips        Nutrition Counseling:  Strategies: meal planning, portion sizes, healthy snack choices, hydration, fiber intake, protein intake, exercise, food journal        Monitoring and Evaluation:  Evaluation criteria:  Energy Intake  Meet protein needs  Maintain adequate hydration  Monitor weekly weight  Meal planning/preparation  Food journal   Decreased portions at mealtimes and snacks  Physical activity    Barriers to learning:none  Readiness to change: Preparation:  (Getting ready to change)   Comprehension: very good  Expected Compliance: very good

## 2021-08-18 ENCOUNTER — OFFICE VISIT (OUTPATIENT)
Dept: BARIATRICS | Facility: CLINIC | Age: 31
End: 2021-08-18

## 2021-08-18 VITALS — WEIGHT: 261.3 LBS | HEIGHT: 65 IN | BODY MASS INDEX: 43.53 KG/M2

## 2021-08-18 DIAGNOSIS — R63.5 ABNORMAL WEIGHT GAIN: Primary | ICD-10-CM

## 2021-08-18 PROCEDURE — 3008F BODY MASS INDEX DOCD: CPT | Performed by: PHYSICIAN ASSISTANT

## 2021-08-18 PROCEDURE — RECHECK

## 2021-08-24 ENCOUNTER — TELEPHONE (OUTPATIENT)
Dept: OBGYN CLINIC | Facility: CLINIC | Age: 31
End: 2021-08-24

## 2021-08-24 NOTE — TELEPHONE ENCOUNTER
Pt LM to review her BW  She would like to know if there are any changes to her Metformin, she needs a refill unsure if dose is changing

## 2021-10-14 ENCOUNTER — TELEPHONE (OUTPATIENT)
Dept: OBGYN CLINIC | Facility: CLINIC | Age: 31
End: 2021-10-14

## 2021-11-11 ENCOUNTER — TELEPHONE (OUTPATIENT)
Dept: OBGYN CLINIC | Facility: CLINIC | Age: 31
End: 2021-11-11

## 2021-11-12 ENCOUNTER — TELEPHONE (OUTPATIENT)
Dept: OBGYN CLINIC | Facility: CLINIC | Age: 31
End: 2021-11-12

## 2021-11-24 ENCOUNTER — TELEPHONE (OUTPATIENT)
Dept: OBGYN CLINIC | Facility: CLINIC | Age: 31
End: 2021-11-24

## 2022-01-13 DIAGNOSIS — F41.8 MIXED ANXIETY AND DEPRESSIVE DISORDER: ICD-10-CM

## 2022-01-13 RX ORDER — DULOXETIN HYDROCHLORIDE 20 MG/1
CAPSULE, DELAYED RELEASE ORAL
Qty: 60 CAPSULE | OUTPATIENT
Start: 2022-01-13

## 2022-01-23 DIAGNOSIS — F41.8 MIXED ANXIETY AND DEPRESSIVE DISORDER: ICD-10-CM

## 2022-01-25 RX ORDER — DULOXETIN HYDROCHLORIDE 20 MG/1
40 CAPSULE, DELAYED RELEASE ORAL DAILY
Qty: 180 CAPSULE | Refills: 0 | Status: SHIPPED | OUTPATIENT
Start: 2022-01-25 | End: 2022-03-15

## 2022-03-07 ENCOUNTER — TELEPHONE (OUTPATIENT)
Dept: OBGYN CLINIC | Facility: CLINIC | Age: 32
End: 2022-03-07

## 2022-03-07 NOTE — TELEPHONE ENCOUNTER
Spoke with patient  Has not been doing regularly since her last positive  States was faint this am but did take another this afternoon and was darker  A friend told her that her line should be darker or as dark as the control line to be considered positive  Wants to know if she has been getting all negatives then if the line has been faint  Does admit to not testing a lot and concerned that maybe is missing it since she took one this am and one this pm and was different in color  Aware will review further with Kalpesh and call her back tomorrow  Patient was fine to wait until tomorrow

## 2022-03-07 NOTE — TELEPHONE ENCOUNTER
Bought ovulation kit 2 weeks ago  Was due for menses around now, neg UPT  Took ovulation test this am and a faint second line, but thought she already ovulated  Would like to review

## 2022-03-07 NOTE — TELEPHONE ENCOUNTER
Pt has a h/o PCOS which can often give false + OPKS  Her periods have also been very irregular historically  When I spoke w her she had gotten a February period  Has she been taking then OPKs the entire time and just got another faint pink today, or had she not done them since her last +?

## 2022-03-08 NOTE — TELEPHONE ENCOUNTER
Spoke w pt and reviewed OPKs  Pt does seem to be getting + OPKs at different times of the month  Last month her + OPK was faint +but then her cycle did come about 2 weeks later  Pt now had + OPK 2 weeks ago and is having another surge now  She id due for her menses based on OPKs  Pt had negative UPT yesterday  We reviewed PCOS and chances for false + OPKs  Pt also uses fertility tracker [facility] to track ovulation as well  All questions answered  Will plan to update me with cycle or OPKs over the next few months

## 2022-03-15 ENCOUNTER — OFFICE VISIT (OUTPATIENT)
Dept: FAMILY MEDICINE CLINIC | Facility: CLINIC | Age: 32
End: 2022-03-15
Payer: COMMERCIAL

## 2022-03-15 VITALS
HEIGHT: 65 IN | WEIGHT: 263 LBS | HEART RATE: 90 BPM | DIASTOLIC BLOOD PRESSURE: 82 MMHG | SYSTOLIC BLOOD PRESSURE: 120 MMHG | BODY MASS INDEX: 43.82 KG/M2 | RESPIRATION RATE: 16 BRPM | OXYGEN SATURATION: 97 % | TEMPERATURE: 98.1 F

## 2022-03-15 DIAGNOSIS — R41.840 INATTENTION: ICD-10-CM

## 2022-03-15 DIAGNOSIS — F41.8 MIXED ANXIETY AND DEPRESSIVE DISORDER: Primary | ICD-10-CM

## 2022-03-15 PROCEDURE — 99214 OFFICE O/P EST MOD 30 MIN: CPT | Performed by: FAMILY MEDICINE

## 2022-03-15 PROCEDURE — 1036F TOBACCO NON-USER: CPT | Performed by: FAMILY MEDICINE

## 2022-03-15 PROCEDURE — 3008F BODY MASS INDEX DOCD: CPT | Performed by: FAMILY MEDICINE

## 2022-03-15 PROCEDURE — 3725F SCREEN DEPRESSION PERFORMED: CPT | Performed by: FAMILY MEDICINE

## 2022-03-15 RX ORDER — DULOXETIN HYDROCHLORIDE 60 MG/1
60 CAPSULE, DELAYED RELEASE ORAL DAILY
Qty: 90 CAPSULE | Refills: 1 | Status: SHIPPED | OUTPATIENT
Start: 2022-03-15

## 2022-03-15 NOTE — PROGRESS NOTES
Assessment/Plan:    No problem-specific Assessment & Plan notes found for this encounter  Diagnoses and all orders for this visit:    Mixed anxiety and depressive disorder  Comments:  I suspect the main  for her inattention is anxiety and depression that could be more optimally controlled  increase cymbalta to 60mg   f/u 6 weeks  Orders:  -     DULoxetine (CYMBALTA) 60 mg delayed release capsule; Take 1 capsule (60 mg total) by mouth daily    Inattention  Comments:  see above  refer to psych for further eval/optimization  Orders:  -     DULoxetine (CYMBALTA) 60 mg delayed release capsule; Take 1 capsule (60 mg total) by mouth daily  -     Ambulatory Referral to Psychiatry; Future        Subjective:      Patient ID: Guillermo Elder is a 32 y o  female  HPI  Pt presents concerned about inattentiveness  She notes that she often feels as though she can't get anything done  Loses things at home  Seeing counselor who wonders about adhd  Pt states she has trouble concentrating  Doesn't get things done when she has a lot to do because she feels overwhelmed, does nothing, and then feels bad about it  Notes her anxiety and depression are under better control, but she still notes worry, jitteriness  Low interest in activities  Low motivation  Some sadness  No hopelessness  No si/hi  She notes she did really well in school  Wasn't getting in trouble as a young person for talking or not waiting in line  States school experience got worse when she was in high school when her depression started        The following portions of the patient's history were reviewed and updated as appropriate: allergies, current medications, past family history, past medical history, past social history, past surgical history and problem list     Review of Systems    See hpi    Objective:      /82 (BP Location: Left arm, Patient Position: Sitting, Cuff Size: Large)   Pulse 90   Temp 98 1 °F (36 7 °C) (Temporal) Resp 16   Ht 5' 4 5" (1 638 m)   Wt 119 kg (263 lb)   LMP 02/05/2022 (Approximate)   SpO2 97%   BMI 44 45 kg/m²          Physical Exam  Constitutional:       Appearance: Normal appearance  HENT:      Head: Normocephalic and atraumatic  Eyes:      Extraocular Movements: Extraocular movements intact  Conjunctiva/sclera: Conjunctivae normal    Cardiovascular:      Rate and Rhythm: Normal rate and regular rhythm  Pulses: Normal pulses  Heart sounds: No murmur heard  No friction rub  No gallop  Neurological:      General: No focal deficit present  Mental Status: She is alert  Cranial Nerves: No cranial nerve deficit  Psychiatric:         Attention and Perception: Attention normal          Mood and Affect: Mood is anxious  Speech: Speech is rapid and pressured  Behavior: Behavior normal          Thought Content:  Thought content normal          Cognition and Memory: Cognition normal          Judgment: Judgment normal

## 2022-03-21 ENCOUNTER — TELEPHONE (OUTPATIENT)
Dept: OBGYN CLINIC | Facility: CLINIC | Age: 32
End: 2022-03-21

## 2022-03-21 ENCOUNTER — TELEPHONE (OUTPATIENT)
Dept: PSYCHIATRY | Facility: CLINIC | Age: 32
End: 2022-03-21

## 2022-03-30 ENCOUNTER — HOSPITAL ENCOUNTER (OUTPATIENT)
Dept: RADIOLOGY | Facility: HOSPITAL | Age: 32
Discharge: HOME | End: 2022-03-30
Admitting: RADIOLOGY
Payer: COMMERCIAL

## 2022-03-30 DIAGNOSIS — Z31.41 ENCOUNTER FOR FERTILITY TESTING: ICD-10-CM

## 2022-03-30 PROCEDURE — 74740 X-RAY FEMALE GENITAL TRACT: CPT

## 2022-03-30 PROCEDURE — 58340 CATHETER FOR HYSTEROGRAPHY: CPT

## 2022-03-30 RX ADMIN — IOHEXOL 10 ML: 300 INJECTION, SOLUTION INTRAVENOUS at 14:10

## 2022-03-30 NOTE — BRIEF OP NOTE (RAD/CATH)
HSG (Hysterosalpingogram):   Pt presents today to the fluoroscopy suite for HSG to evaluate for tubal patency  Pt was placed in supine position  Sterile speculum was placed in the vagina and cervix was visualized  Cervix cleansed with Betadine prep x 3  Balloon catheter threaded through the cervix and into the uterine cavity  Tenaculum applied to cervix  Bulb was inflated  Radiologist entered and approx 4cc of radio opaque dye injected into pts uterine cavity  Fluoroscopy images indicate b/l tubal patency with spill noted from both sides  Impression:   Successful HSG with evidence of bilateral tubal patency  Possible arcuate uterus noted as well on HSG  Plan:  Partner has follow up planned next week with PCP for management of leukocytospermia  Once tx can consider addition Letrozole 2 5 mg days 3-7 of her next cycle to help induce timely ovulation

## 2022-05-13 DIAGNOSIS — E66.01 CLASS 3 SEVERE OBESITY WITH BODY MASS INDEX (BMI) OF 40.0 TO 44.9 IN ADULT, UNSPECIFIED OBESITY TYPE, UNSPECIFIED WHETHER SERIOUS COMORBIDITY PRESENT (HCC): ICD-10-CM

## 2022-05-24 ENCOUNTER — TELEPHONE (OUTPATIENT)
Dept: OBGYN CLINIC | Facility: CLINIC | Age: 32
End: 2022-05-24

## 2022-05-24 NOTE — TELEPHONE ENCOUNTER
Pt states she has been trying to get pregnant and has been unsuccessful but has questions if possible she is/was pregnant and didn't test appropriately  Pt reports having increased urination, increased appetite, and increased depression as she states that has been very stable on her medications until recently  Pt states she feels safe with herself and does not have any thoughts/plans to harm herself in any way, denies SI  Pt also reports had small amount of spotting for one day this past Thursday  Pt reports no cramping at this time but has had some dull pain ever since she received dye from her recent test  Pt states she took an pregnancy test at home 2 days ago and it was negative, reports LMP 4/26  Pt aware will review and call her back

## 2022-05-24 NOTE — TELEPHONE ENCOUNTER
Spoke w pt and reviewed sx  Aware may have tested too early as she is not due for her cycle until next week  If menses comes will plan Letrozole w her next period forup to 3 cycles  Pt aware of risks/benefits and off label usage  Partner has follow up next week w PCP for f/u SA  Will confirm normal results prior to starting letrozole as well

## 2022-06-27 DIAGNOSIS — R53.83 FATIGUE, UNSPECIFIED TYPE: Primary | ICD-10-CM

## 2022-06-28 ENCOUNTER — APPOINTMENT (OUTPATIENT)
Dept: LAB | Facility: CLINIC | Age: 32
End: 2022-06-28
Payer: COMMERCIAL

## 2022-06-28 DIAGNOSIS — R53.83 FATIGUE, UNSPECIFIED TYPE: ICD-10-CM

## 2022-06-28 LAB
25(OH)D3 SERPL-MCNC: 31.4 NG/ML (ref 30–100)
ALBUMIN SERPL BCP-MCNC: 3.6 G/DL (ref 3.5–5)
ALP SERPL-CCNC: 93 U/L (ref 46–116)
ALT SERPL W P-5'-P-CCNC: 33 U/L (ref 12–78)
ANION GAP SERPL CALCULATED.3IONS-SCNC: 6 MMOL/L (ref 4–13)
AST SERPL W P-5'-P-CCNC: 18 U/L (ref 5–45)
BASOPHILS # BLD AUTO: 0.08 THOUSANDS/ΜL (ref 0–0.1)
BASOPHILS NFR BLD AUTO: 1 % (ref 0–1)
BILIRUB SERPL-MCNC: 0.32 MG/DL (ref 0.2–1)
BUN SERPL-MCNC: 13 MG/DL (ref 5–25)
CALCIUM SERPL-MCNC: 9.3 MG/DL (ref 8.3–10.1)
CHLORIDE SERPL-SCNC: 105 MMOL/L (ref 100–108)
CO2 SERPL-SCNC: 27 MMOL/L (ref 21–32)
CREAT SERPL-MCNC: 0.78 MG/DL (ref 0.6–1.3)
EOSINOPHIL # BLD AUTO: 0.66 THOUSAND/ΜL (ref 0–0.61)
EOSINOPHIL NFR BLD AUTO: 8 % (ref 0–6)
ERYTHROCYTE [DISTWIDTH] IN BLOOD BY AUTOMATED COUNT: 14 % (ref 11.6–15.1)
GFR SERPL CREATININE-BSD FRML MDRD: 101 ML/MIN/1.73SQ M
GLUCOSE P FAST SERPL-MCNC: 98 MG/DL (ref 65–99)
HCT VFR BLD AUTO: 43.9 % (ref 34.8–46.1)
HGB BLD-MCNC: 14.1 G/DL (ref 11.5–15.4)
IMM GRANULOCYTES # BLD AUTO: 0.02 THOUSAND/UL (ref 0–0.2)
IMM GRANULOCYTES NFR BLD AUTO: 0 % (ref 0–2)
LYMPHOCYTES # BLD AUTO: 2.34 THOUSANDS/ΜL (ref 0.6–4.47)
LYMPHOCYTES NFR BLD AUTO: 27 % (ref 14–44)
MCH RBC QN AUTO: 28.9 PG (ref 26.8–34.3)
MCHC RBC AUTO-ENTMCNC: 32.1 G/DL (ref 31.4–37.4)
MCV RBC AUTO: 90 FL (ref 82–98)
MONOCYTES # BLD AUTO: 0.58 THOUSAND/ΜL (ref 0.17–1.22)
MONOCYTES NFR BLD AUTO: 7 % (ref 4–12)
NEUTROPHILS # BLD AUTO: 5.07 THOUSANDS/ΜL (ref 1.85–7.62)
NEUTS SEG NFR BLD AUTO: 57 % (ref 43–75)
NRBC BLD AUTO-RTO: 0 /100 WBCS
PLATELET # BLD AUTO: 348 THOUSANDS/UL (ref 149–390)
PMV BLD AUTO: 9.9 FL (ref 8.9–12.7)
POTASSIUM SERPL-SCNC: 4.6 MMOL/L (ref 3.5–5.3)
PROT SERPL-MCNC: 7.8 G/DL (ref 6.4–8.2)
RBC # BLD AUTO: 4.88 MILLION/UL (ref 3.81–5.12)
SODIUM SERPL-SCNC: 138 MMOL/L (ref 136–145)
TSH SERPL DL<=0.05 MIU/L-ACNC: 1.4 UIU/ML (ref 0.45–4.5)
VIT B12 SERPL-MCNC: 410 PG/ML (ref 100–900)
WBC # BLD AUTO: 8.75 THOUSAND/UL (ref 4.31–10.16)

## 2022-06-28 PROCEDURE — 36415 COLL VENOUS BLD VENIPUNCTURE: CPT

## 2022-06-28 PROCEDURE — 82306 VITAMIN D 25 HYDROXY: CPT

## 2022-06-28 PROCEDURE — 82607 VITAMIN B-12: CPT

## 2022-06-28 PROCEDURE — 80053 COMPREHEN METABOLIC PANEL: CPT

## 2022-06-28 PROCEDURE — 85025 COMPLETE CBC W/AUTO DIFF WBC: CPT

## 2022-06-28 PROCEDURE — 84443 ASSAY THYROID STIM HORMONE: CPT

## 2022-07-21 ENCOUNTER — TELEPHONE (OUTPATIENT)
Dept: FAMILY MEDICINE CLINIC | Facility: CLINIC | Age: 32
End: 2022-07-21

## 2022-07-22 NOTE — TELEPHONE ENCOUNTER
Phone call placed to patient, patient declines appointment, states she does not have time to come in today

## 2022-08-11 ENCOUNTER — APPOINTMENT (OUTPATIENT)
Dept: LAB | Facility: CLINIC | Age: 32
End: 2022-08-11
Payer: COMMERCIAL

## 2022-08-11 DIAGNOSIS — Z01.83 ENCOUNTER FOR BLOOD TYPING: ICD-10-CM

## 2022-08-11 DIAGNOSIS — Z11.3 SCREENING EXAMINATION FOR VENEREAL DISEASE: ICD-10-CM

## 2022-08-11 DIAGNOSIS — Z11.4 SCREENING FOR HUMAN IMMUNODEFICIENCY VIRUS: ICD-10-CM

## 2022-08-11 DIAGNOSIS — Z11.59 SPECIAL SCREENING EXAMINATION FOR VIRAL DISEASE: ICD-10-CM

## 2022-08-11 DIAGNOSIS — E28.2 POLYCYSTIC OVARIES: ICD-10-CM

## 2022-08-11 DIAGNOSIS — Z31.41 FERTILITY TESTING: ICD-10-CM

## 2022-08-11 LAB
ABO GROUP BLD: NORMAL
BLD GP AB SCN SERPL QL: NEGATIVE
CHOLEST SERPL-MCNC: 244 MG/DL
HBV SURFACE AG SER QL: NORMAL
HDLC SERPL-MCNC: 35 MG/DL
LDLC SERPL CALC-MCNC: 164 MG/DL (ref 0–100)
NONHDLC SERPL-MCNC: 209 MG/DL
RH BLD: POSITIVE
RPR SER QL: NORMAL
RUBV IGG SERPL IA-ACNC: 123.4 IU/ML
TRIGL SERPL-MCNC: 227 MG/DL
VZV IGG SER QL IA: ABNORMAL

## 2022-08-11 PROCEDURE — 86803 HEPATITIS C AB TEST: CPT

## 2022-08-11 PROCEDURE — 86900 BLOOD TYPING SEROLOGIC ABO: CPT

## 2022-08-11 PROCEDURE — 82627 DEHYDROEPIANDROSTERONE: CPT

## 2022-08-11 PROCEDURE — 80061 LIPID PANEL: CPT

## 2022-08-11 PROCEDURE — 86592 SYPHILIS TEST NON-TREP QUAL: CPT

## 2022-08-11 PROCEDURE — 87340 HEPATITIS B SURFACE AG IA: CPT

## 2022-08-11 PROCEDURE — 36415 COLL VENOUS BLD VENIPUNCTURE: CPT

## 2022-08-11 PROCEDURE — 83498 ASY HYDROXYPROGESTERONE 17-D: CPT

## 2022-08-11 PROCEDURE — 84402 ASSAY OF FREE TESTOSTERONE: CPT

## 2022-08-11 PROCEDURE — 86901 BLOOD TYPING SEROLOGIC RH(D): CPT

## 2022-08-11 PROCEDURE — 82397 CHEMILUMINESCENT ASSAY: CPT

## 2022-08-11 PROCEDURE — 87591 N.GONORRHOEAE DNA AMP PROB: CPT

## 2022-08-11 PROCEDURE — 86762 RUBELLA ANTIBODY: CPT

## 2022-08-11 PROCEDURE — 86850 RBC ANTIBODY SCREEN: CPT

## 2022-08-11 PROCEDURE — 87389 HIV-1 AG W/HIV-1&-2 AB AG IA: CPT

## 2022-08-11 PROCEDURE — 86787 VARICELLA-ZOSTER ANTIBODY: CPT

## 2022-08-11 PROCEDURE — 84403 ASSAY OF TOTAL TESTOSTERONE: CPT

## 2022-08-11 PROCEDURE — 87491 CHLMYD TRACH DNA AMP PROBE: CPT

## 2022-08-12 LAB
C TRACH DNA SPEC QL NAA+PROBE: NEGATIVE
HCV AB S/CO SERPL IA: <0.1 S/CO RATIO (ref 0–0.9)
HIV 1+2 AB+HIV1 P24 AG SERPL QL IA: NORMAL
N GONORRHOEA DNA SPEC QL NAA+PROBE: NEGATIVE
SL AMB INTERPRETATION: NORMAL

## 2022-08-13 LAB
DHEA-S SERPL-MCNC: 359 UG/DL (ref 84.8–378)
TESTOST FREE SERPL-MCNC: 1.7 PG/ML (ref 0–4.2)
TESTOST SERPL-MCNC: 34 NG/DL (ref 8–60)

## 2022-08-15 LAB — 17OHP SERPL-MCNC: 31 NG/DL

## 2022-08-17 ENCOUNTER — TELEPHONE (OUTPATIENT)
Dept: PSYCHIATRY | Facility: CLINIC | Age: 32
End: 2022-08-17

## 2022-08-17 NOTE — TELEPHONE ENCOUNTER
contacted patient off med mgmt waitlist to verify needs of services in attempts to update waitlist  spoke w  patient whom is still interested in services prefers locatin closer to place of residenc

## 2022-08-19 LAB — MIS SERPL-MCNC: 2.67 NG/ML

## 2022-09-08 DIAGNOSIS — E66.01 CLASS 3 SEVERE OBESITY WITH BODY MASS INDEX (BMI) OF 40.0 TO 44.9 IN ADULT, UNSPECIFIED OBESITY TYPE, UNSPECIFIED WHETHER SERIOUS COMORBIDITY PRESENT (HCC): ICD-10-CM

## 2022-09-09 ENCOUNTER — TELEPHONE (OUTPATIENT)
Dept: FAMILY MEDICINE CLINIC | Facility: CLINIC | Age: 32
End: 2022-09-09

## 2022-09-09 DIAGNOSIS — E66.01 CLASS 3 SEVERE OBESITY WITH BODY MASS INDEX (BMI) OF 40.0 TO 44.9 IN ADULT, UNSPECIFIED OBESITY TYPE, UNSPECIFIED WHETHER SERIOUS COMORBIDITY PRESENT (HCC): ICD-10-CM

## 2022-09-09 NOTE — TELEPHONE ENCOUNTER
Pt called she needs to have the varicella vaccine she is going for fertility treatments and she is no longer has immunity, she will also need a pregnancy test also because they are actively trying to get pregnant  Please advise pt

## 2022-09-09 NOTE — TELEPHONE ENCOUNTER
Patient last seen 3/2022        Return in about 6 weeks (around 4/26/2022    Last three scheduled appointments no show or canceled  Appointment made with PCP

## 2022-09-13 ENCOUNTER — OFFICE VISIT (OUTPATIENT)
Dept: FAMILY MEDICINE CLINIC | Facility: CLINIC | Age: 32
End: 2022-09-13
Payer: COMMERCIAL

## 2022-09-13 VITALS
SYSTOLIC BLOOD PRESSURE: 132 MMHG | DIASTOLIC BLOOD PRESSURE: 78 MMHG | HEIGHT: 65 IN | OXYGEN SATURATION: 96 % | WEIGHT: 258 LBS | HEART RATE: 98 BPM | BODY MASS INDEX: 42.99 KG/M2 | RESPIRATION RATE: 16 BRPM | TEMPERATURE: 97.6 F

## 2022-09-13 DIAGNOSIS — F41.8 MIXED ANXIETY AND DEPRESSIVE DISORDER: ICD-10-CM

## 2022-09-13 DIAGNOSIS — Z23 NEED FOR VARICELLA VACCINE: ICD-10-CM

## 2022-09-13 DIAGNOSIS — J45.20 MILD INTERMITTENT EXTRINSIC ASTHMA WITHOUT COMPLICATION: Primary | ICD-10-CM

## 2022-09-13 PROCEDURE — 90471 IMMUNIZATION ADMIN: CPT

## 2022-09-13 PROCEDURE — 90716 VAR VACCINE LIVE SUBQ: CPT

## 2022-09-13 PROCEDURE — 99213 OFFICE O/P EST LOW 20 MIN: CPT | Performed by: FAMILY MEDICINE

## 2022-09-13 RX ORDER — DULOXETIN HYDROCHLORIDE 60 MG/1
60 CAPSULE, DELAYED RELEASE ORAL DAILY
Qty: 90 CAPSULE | Refills: 3 | Status: SHIPPED | OUTPATIENT
Start: 2022-09-13

## 2022-09-13 RX ORDER — MELATONIN
1000 DAILY
COMMUNITY

## 2022-09-13 RX ORDER — MOMETASONE FUROATE 200 UG/1
2 AEROSOL RESPIRATORY (INHALATION) 2 TIMES DAILY
Qty: 13 G | Refills: 3 | Status: SHIPPED | OUTPATIENT
Start: 2022-09-13

## 2022-09-13 NOTE — PROGRESS NOTES
Name: Samreen Chowdhury      : 1990      MRN: [id number]  Encounter Provider: Jose Price DO  Encounter Date: 2022   Encounter department: [address] [place] [address]  Mild intermittent extrinsic asthma without complication  Comments:  may use asmanex as needed   albuterol as needed  Orders:  -     Mometasone Furoate (Asmanex HFA) 200 MCG/ACT AERO; Inhale 2 puffs (400 mcg total) 2 (two) times a day Rinse mouth after use  2  Mixed anxiety and depressive disorder  Comments:  doing well on cymbalta  continue same  Orders:  -     DULoxetine (CYMBALTA) 60 mg delayed release capsule; Take 1 capsule (60 mg total) by mouth daily    3  Need for varicella vaccine  -     Varicella Vaccine SQ           Subjective      HPI   Pt presents to get varicella immunization  She is non-immune and trying to get pregnant  She will hold trying to 2 months after  Pt requests refill on cymbalta  Doing well on current meds and wants to continue same  No si/hi  No anxiety  Pt requests asmanex which she uses as needed  Got a cat and is allergic  If she finds she has some chest congestion or tightness, she uses it and symptoms resolve  Has albuterol at home  Review of Systems  See hpi    Current Outpatient Medications on File Prior to Visit   Medication Sig    cetirizine (ZyrTEC) 10 mg tablet Take 10 mg by mouth daily    cholecalciferol (VITAMIN D3) 1,000 units tablet Take 1,000 Units by mouth daily    metFORMIN (GLUCOPHAGE) 500 mg tablet Take 1 tablet (500 mg total) by mouth 2 (two) times a day    Prenatal Vit-DSS-Fe Cbn-FA (PRENATAL AD PO) Take by mouth    Ubiquinol 200 MG CAPS     [DISCONTINUED] DULoxetine (CYMBALTA) 60 mg delayed release capsule Take 1 capsule (60 mg total) by mouth daily    letrozole (FEMARA) 2 5 mg tablet Take 1 tablet (2 5 mg total) by mouth in the morning  Take days 2-6 of cycle    (Patient not taking: Reported on 2022)       Objective     BP 132/78 (BP Location: Left arm, Patient Position: Sitting, Cuff Size: Large)   Pulse 98   Temp 97 6 °F (36 4 °C)   Resp 16   Ht 5' 4 5" (1 638 m)   Wt 117 kg (258 lb)   SpO2 96%   BMI 43 60 kg/m²     Physical Exam  Constitutional:       Appearance: Normal appearance  HENT:      Head: Normocephalic and atraumatic  Eyes:      Extraocular Movements: Extraocular movements intact  Conjunctiva/sclera: Conjunctivae normal    Cardiovascular:      Rate and Rhythm: Normal rate and regular rhythm  Heart sounds: No murmur heard  No friction rub  No gallop  Pulmonary:      Effort: Pulmonary effort is normal       Breath sounds: Normal breath sounds  No wheezing, rhonchi or rales  Neurological:      General: No focal deficit present  Mental Status: She is alert and oriented to person, place, and time         Yen Blackwood DO

## 2022-10-14 ENCOUNTER — CLINICAL SUPPORT (OUTPATIENT)
Dept: FAMILY MEDICINE CLINIC | Facility: CLINIC | Age: 32
End: 2022-10-14
Payer: COMMERCIAL

## 2022-10-14 DIAGNOSIS — Z23 ENCOUNTER FOR IMMUNIZATION: Primary | ICD-10-CM

## 2022-10-14 PROCEDURE — 90471 IMMUNIZATION ADMIN: CPT

## 2022-10-14 PROCEDURE — 90716 VAR VACCINE LIVE SUBQ: CPT

## 2022-11-01 ENCOUNTER — OFFICE VISIT (OUTPATIENT)
Dept: BARIATRICS | Facility: CLINIC | Age: 32
End: 2022-11-01

## 2022-11-01 VITALS
WEIGHT: 254.4 LBS | SYSTOLIC BLOOD PRESSURE: 126 MMHG | BODY MASS INDEX: 43.43 KG/M2 | HEART RATE: 84 BPM | DIASTOLIC BLOOD PRESSURE: 84 MMHG | HEIGHT: 64 IN

## 2022-11-01 DIAGNOSIS — E66.01 OBESITY, CLASS III, BMI 40-49.9 (MORBID OBESITY) (HCC): Primary | ICD-10-CM

## 2022-11-01 RX ORDER — NORETHINDRONE AND ETHINYL ESTRADIOL 1 MG-35MCG
KIT ORAL
COMMUNITY
Start: 2022-10-25

## 2022-11-01 RX ORDER — CLOTRIMAZOLE AND BETAMETHASONE DIPROPIONATE 10; .64 MG/G; MG/G
CREAM TOPICAL
COMMUNITY
Start: 2022-10-30

## 2022-11-01 RX ORDER — CHORIOGONADOTROPIN ALFA 250 UG/.5ML
INJECTION, SOLUTION SUBCUTANEOUS
COMMUNITY
Start: 2022-10-31

## 2022-11-01 RX ORDER — PROGESTERONE 200 MG/1
CAPSULE ORAL
COMMUNITY
Start: 2022-10-25

## 2022-11-01 NOTE — PROGRESS NOTES
Assessment/Plan:  Teresita Hawkins was seen today for consult  Diagnoses and all orders for this visit:    Obesity, Class III, BMI 40-49 9 (morbid obesity) ([facility] [address] )    Patient has been able to lose weight on her own  She was congratulated  Since she is trying to conceive she can not take any medication  I have recommended visiting with the dietitian again and she was in agreement  Patient encouraged to keep up the good work  Once patient conceives I recommended against additional weight loss  Patient acknowledged understanding  Goals:  Food log (ie ) www myfitnesspal com,sparkpeople  com,[employer] com,calorieking  com,etc  ,   No sugary beverages  At least 64oz of water daily  ,   Increase physical activity by 10 minutes daily and   Gradually increase physical activity to a goal of 5 days per week for 30 minutes of MODERATE intensity PLUS 2 days per week of FULL BODY resistance training  Start seeing dietitian again  Total time spent: 30 minutes with >50% face-to-face time with the patient  Follow up in approximately 2 weeks with Non-Surgical Dietician  Subjective:   Chief Complaint   Patient presents with   • Consult     Pt is here today for MWM consult  Patient ID: Panchito Marie  is a 32 y o  female with excess weight/obesity here to pursue weight management  Patient is pursuing Dietitian bundles  Most recent notes and records were reviewed  Patient presents for medical weight management follow-up  Previously was seen and opted to utilize dietitian bundles to help with weight loss  Patient states she bought six bundles but only used two  She was busy with traveling for work  Now she is working from home and has more time  She is actively pursuing fertility as she is trying to conceive  Has made some dietary changes and has been able to lose some weight    No acute concerns     -Initial weight loss goal of 5-10% weight loss for improved health  Wt Readings from Last 10 Encounters:   11/01/22 115 kg (254 lb 6 4 oz)   10/14/22 117 kg (258 lb)   09/13/22 117 kg (258 lb)   03/15/22 119 kg (263 lb)   08/18/21 119 kg (261 lb 4 8 oz)   07/28/21 122 kg (268 lb 11 2 oz)   07/20/21 121 kg (267 lb 6 4 oz)   05/28/21 122 kg (269 lb 12 8 oz)   03/10/21 121 kg (267 lb)     Initial weight: 267 4lbs  Current weight: 254 4lbs  Change in weight: -13lbs      B- protein shake w/ fruit  S- no  L- leftovers or stuffed pasta shell  S- cheese sticks or popcorn  D- protein, veggie and starch  S- sometimes ice cream bar  Drinks- 8-16oz water daily, 8-16oz coffee w/ sweetened creamer daily  Alcohol- no  Exercise- treadmill 2-3x/wk 30min      The following portions of the patient's history were reviewed and updated as appropriate: allergies, current medications, past family history, past medical history, past social history, past surgical history, and problem list     Review of Systems    Objective:  /84 (BP Location: Left arm, Patient Position: Sitting, Cuff Size: Large)   Pulse 84   Ht 5' 4" (1 626 m)   Wt 115 kg (254 lb 6 4 oz)   LMP 10/17/2022   BMI 43 67 kg/m²   Constitutional: Well-developed, well-nourished and Obese Body mass index is 43 67 kg/m²  Milad Jacome HEENT: No conjunctival injection  Pulmonary: No increased work of breathing or signs of respiratory distress  CV: Well-perfused  GI: Obese  Non-distended  MSK: No edema   Neuro: Oriented to person, place and time  Normal Speech  Normal gait  Psych: Normal affect and mood  Labs and Imaging  Recent labs and imaging have been personally reviewed    Lab Results   Component Value Date    WBC 8 75 06/28/2022    HGB 14 1 06/28/2022    HCT 43 9 06/28/2022    MCV 90 06/28/2022     06/28/2022     Lab Results   Component Value Date    SODIUM 138 06/28/2022    K 4 6 06/28/2022     06/28/2022    CO2 27 06/28/2022    AGAP 6 06/28/2022    BUN 13 06/28/2022    CREATININE 0 78 06/28/2022    GLUC 102 (H) 02/19/2022    GLUF 98 06/28/2022 CALCIUM 9 3 06/28/2022    AST 18 06/28/2022    ALT 33 06/28/2022    ALKPHOS 93 06/28/2022    TP 7 8 06/28/2022    TBILI 0 32 06/28/2022    EGFR 101 06/28/2022     Lab Results   Component Value Date    HGBA1C 5 0 02/19/2022     Lab Results   Component Value Date    PHN4UEDYHUCJ 1 400 06/28/2022    TSH 1 40 03/12/2021     Lab Results   Component Value Date    CHOLESTEROL 244 (H) 08/11/2022     Lab Results   Component Value Date    HDL 35 (L) 08/11/2022     Lab Results   Component Value Date    TRIG 227 (H) 08/11/2022     Lab Results   Component Value Date    LDLCALC 164 (H) 08/11/2022

## 2023-01-04 ENCOUNTER — HOSPITAL ENCOUNTER (OUTPATIENT)
Facility: HOSPITAL | Age: 33
Setting detail: OUTPATIENT SURGERY
Discharge: HOME | End: 2023-01-04
Attending: OBSTETRICS & GYNECOLOGY | Admitting: OBSTETRICS & GYNECOLOGY

## 2023-01-04 DIAGNOSIS — O02.1 MISSED ABORTION: ICD-10-CM

## 2023-01-04 DIAGNOSIS — E66.01 CLASS 3 SEVERE OBESITY WITH BODY MASS INDEX (BMI) OF 40.0 TO 44.9 IN ADULT, UNSPECIFIED OBESITY TYPE, UNSPECIFIED WHETHER SERIOUS COMORBIDITY PRESENT (HCC): ICD-10-CM

## 2023-01-05 ENCOUNTER — ANESTHESIA EVENT (OUTPATIENT)
Dept: PERIOP | Facility: HOSPITAL | Age: 33
End: 2023-01-05

## 2023-01-05 RX ORDER — DOCUSATE SODIUM 100 MG/1
100 CAPSULE, LIQUID FILLED ORAL 2 TIMES DAILY
COMMUNITY

## 2023-01-05 NOTE — PRE-PROCEDURE INSTRUCTIONS
Pre-Surgery Instructions:   Medication Instructions   • cetirizine (ZyrTEC) 10 mg tablet Take day of surgery  • cholecalciferol (VITAMIN D3) 1,000 units tablet Stop taking 5 days prior to surgery  • docusate sodium (COLACE) 100 mg capsule Take night before surgery   • DULoxetine (CYMBALTA) 60 mg delayed release capsule Take day of surgery  • metFORMIN (GLUCOPHAGE) 500 mg tablet Hold day of surgery  • Mometasone Furoate (Asmanex HFA) 200 MCG/ACT AERO Uses PRN- OK to take day of surgery   • Prenatal Vit-DSS-Fe Cbn-FA (PRENATAL AD PO) Stop taking 5 days prior to surgery  You will receive a phone call from hospital for arrival time day before the procedure between 2-8pm  Please call surgeons office if any changes in your condition  Wear easy on/off clothing; consider type of surgery  Valuables, jewelry, piercings please keep at home  No contact lenses  Reviewed COVID protocol and masking policy  Reviewed NPO after MN except medications the morning of with a small sip of water  Follow pre surgery showering or cleaning instructions as  Reviewed by nurse or surgeons office      Questions answered and concerns addressed

## 2023-01-10 ENCOUNTER — ANESTHESIA (OUTPATIENT)
Dept: PERIOP | Facility: HOSPITAL | Age: 33
End: 2023-01-10

## 2023-01-10 VITALS
BODY MASS INDEX: 42.79 KG/M2 | OXYGEN SATURATION: 98 % | DIASTOLIC BLOOD PRESSURE: 74 MMHG | HEART RATE: 82 BPM | SYSTOLIC BLOOD PRESSURE: 128 MMHG | WEIGHT: 250.66 LBS | TEMPERATURE: 98.6 F | HEIGHT: 64 IN | RESPIRATION RATE: 16 BRPM

## 2023-01-10 LAB
ABO GROUP BLD: NORMAL
BLD GP AB SCN SERPL QL: NEGATIVE
RH BLD: POSITIVE
SPECIMEN EXPIRATION DATE: NORMAL

## 2023-01-10 RX ORDER — KETOROLAC TROMETHAMINE 30 MG/ML
INJECTION, SOLUTION INTRAMUSCULAR; INTRAVENOUS AS NEEDED
Status: DISCONTINUED | OUTPATIENT
Start: 2023-01-10 | End: 2023-01-10

## 2023-01-10 RX ORDER — DEXAMETHASONE SODIUM PHOSPHATE 10 MG/ML
INJECTION, SOLUTION INTRAMUSCULAR; INTRAVENOUS AS NEEDED
Status: DISCONTINUED | OUTPATIENT
Start: 2023-01-10 | End: 2023-01-10

## 2023-01-10 RX ORDER — MIDAZOLAM HYDROCHLORIDE 2 MG/2ML
INJECTION, SOLUTION INTRAMUSCULAR; INTRAVENOUS AS NEEDED
Status: DISCONTINUED | OUTPATIENT
Start: 2023-01-10 | End: 2023-01-10

## 2023-01-10 RX ORDER — ACETAMINOPHEN 325 MG/1
975 TABLET ORAL EVERY 6 HOURS PRN
Status: ACTIVE | OUTPATIENT
Start: 2023-01-10

## 2023-01-10 RX ORDER — LIDOCAINE HYDROCHLORIDE 20 MG/ML
INJECTION, SOLUTION EPIDURAL; INFILTRATION; INTRACAUDAL; PERINEURAL AS NEEDED
Status: DISCONTINUED | OUTPATIENT
Start: 2023-01-10 | End: 2023-01-10

## 2023-01-10 RX ORDER — SODIUM CHLORIDE 9 MG/ML
125 INJECTION, SOLUTION INTRAVENOUS CONTINUOUS
Status: DISPENSED | OUTPATIENT
Start: 2023-01-10

## 2023-01-10 RX ORDER — IBUPROFEN 200 MG
600 TABLET ORAL EVERY 6 HOURS PRN
Status: ACTIVE | OUTPATIENT
Start: 2023-01-10

## 2023-01-10 RX ORDER — FENTANYL CITRATE 50 UG/ML
INJECTION, SOLUTION INTRAMUSCULAR; INTRAVENOUS AS NEEDED
Status: DISCONTINUED | OUTPATIENT
Start: 2023-01-10 | End: 2023-01-10

## 2023-01-10 RX ORDER — PROPOFOL 10 MG/ML
INJECTION, EMULSION INTRAVENOUS AS NEEDED
Status: DISCONTINUED | OUTPATIENT
Start: 2023-01-10 | End: 2023-01-10

## 2023-01-10 RX ORDER — MAGNESIUM HYDROXIDE 1200 MG/15ML
LIQUID ORAL AS NEEDED
Status: DISCONTINUED | OUTPATIENT
Start: 2023-01-10 | End: 2023-01-10 | Stop reason: HOSPADM

## 2023-01-10 RX ORDER — DEXMEDETOMIDINE HYDROCHLORIDE 100 UG/ML
INJECTION, SOLUTION INTRAVENOUS AS NEEDED
Status: DISCONTINUED | OUTPATIENT
Start: 2023-01-10 | End: 2023-01-10

## 2023-01-10 RX ORDER — ONDANSETRON 2 MG/ML
INJECTION INTRAMUSCULAR; INTRAVENOUS AS NEEDED
Status: DISCONTINUED | OUTPATIENT
Start: 2023-01-10 | End: 2023-01-10

## 2023-01-10 RX ORDER — ONDANSETRON 2 MG/ML
4 INJECTION INTRAMUSCULAR; INTRAVENOUS ONCE AS NEEDED
Status: DISCONTINUED | OUTPATIENT
Start: 2023-01-10 | End: 2023-01-10 | Stop reason: HOSPADM

## 2023-01-10 RX ORDER — AZITHROMYCIN 250 MG/1
500 TABLET, FILM COATED ORAL ONCE
COMMUNITY

## 2023-01-10 RX ADMIN — DEXMEDETOMIDINE HCL 4 MCG: 100 INJECTION INTRAVENOUS at 07:40

## 2023-01-10 RX ADMIN — ONDANSETRON 4 MG: 2 INJECTION INTRAMUSCULAR; INTRAVENOUS at 07:38

## 2023-01-10 RX ADMIN — DEXAMETHASONE SODIUM PHOSPHATE 10 MG: 10 INJECTION INTRAMUSCULAR; INTRAVENOUS at 07:38

## 2023-01-10 RX ADMIN — PROPOFOL 50 MG: 10 INJECTION, EMULSION INTRAVENOUS at 07:38

## 2023-01-10 RX ADMIN — KETOROLAC TROMETHAMINE 30 MG: 30 INJECTION, SOLUTION INTRAMUSCULAR at 08:02

## 2023-01-10 RX ADMIN — FENTANYL CITRATE 50 MCG: 50 INJECTION INTRAMUSCULAR; INTRAVENOUS at 07:35

## 2023-01-10 RX ADMIN — PROPOFOL 200 MG: 10 INJECTION, EMULSION INTRAVENOUS at 07:35

## 2023-01-10 RX ADMIN — MIDAZOLAM 2 MG: 1 INJECTION INTRAMUSCULAR; INTRAVENOUS at 07:34

## 2023-01-10 RX ADMIN — DEXMEDETOMIDINE HCL 8 MCG: 100 INJECTION INTRAVENOUS at 07:57

## 2023-01-10 RX ADMIN — FENTANYL CITRATE 50 MCG: 50 INJECTION INTRAMUSCULAR; INTRAVENOUS at 07:40

## 2023-01-10 RX ADMIN — DEXMEDETOMIDINE HCL 8 MCG: 100 INJECTION INTRAVENOUS at 07:48

## 2023-01-10 RX ADMIN — LIDOCAINE HYDROCHLORIDE 100 MG: 20 INJECTION, SOLUTION EPIDURAL; INFILTRATION; INTRACAUDAL; PERINEURAL at 07:35

## 2023-01-10 NOTE — ANESTHESIA POSTPROCEDURE EVALUATION
Post-Op Assessment Note    CV Status:  Stable  Pain Score: 1    Pain management: adequate     Mental Status:  Alert and awake   Hydration Status:  Euvolemic   PONV Controlled:  Controlled   Airway Patency:  Patent      Post Op Vitals Reviewed: Yes      Staff: Anesthesiologist         No notable events documented      BP      Temp      Pulse     Resp      SpO2      /68   Pulse 92   Temp 98 6 °F (37 °C)   Resp 15   Ht 5' 4" (1 626 m)   Wt 114 kg (250 lb 10 6 oz)   LMP 10/17/2022   SpO2 95%   BMI 43 03 kg/m²

## 2023-01-10 NOTE — ANESTHESIA PREPROCEDURE EVALUATION
Procedure:  (D&E) (9 WEEKS) (Uterus)    Relevant Problems   CARDIO   (+) Mixed hyperlipidemia      NEURO/PSYCH   (+) Mixed anxiety and depressive disorder      PULMONARY   (+) Asthma   (+) Obstructive sleep apnea syndrome      Endocrine   (+) PCOS (polycystic ovarian syndrome)      Other   (+) Morbid obesity (HCC)        Physical Exam    Airway    Mallampati score: III  TM Distance: >3 FB  Neck ROM: full     Dental   No notable dental hx     Cardiovascular  Rate: normal, Cardiovascular exam normal    Pulmonary  Pulmonary exam normal Breath sounds clear to auscultation,     Other Findings        Anesthesia Plan  ASA Score- 3     Anesthesia Type- general with ASA Monitors  Additional Monitors:   Airway Plan: LMA  Plan Factors-    Chart reviewed  Patient summary reviewed  Patient is not a current smoker  Patient not instructed to abstain from smoking on day of procedure  Patient did not smoke on day of surgery  Induction- intravenous  Postoperative Plan-     Informed Consent- Anesthetic plan and risks discussed with patient Emmie Schwab)

## 2023-01-25 LAB — SCAN RESULT: NORMAL

## 2023-02-16 ENCOUNTER — TELEPHONE (OUTPATIENT)
Dept: PSYCHIATRY | Facility: CLINIC | Age: 33
End: 2023-02-16

## 2023-02-16 NOTE — TELEPHONE ENCOUNTER
This is a new patient coming in on Monday  Her insurance was e-rejected  I spoke to the patient  She recently changed her name which is reflected on her insurance card (we don't have a copy yet)  Is it possible to change the name in Epic without having a copy of the insurance card on file so that the insurance will clear prior to the appointment

## 2025-06-24 NOTE — INTERVAL H&P NOTE
H&P reviewed  After examining the patient I find no changes in the patients condition since the H&P had been written      Vitals:    01/10/23 0607   BP: 118/71   Pulse: 80   Resp: 16   Temp: 98 4 °F (36 9 °C)   SpO2: 94% Yes

## (undated) DEVICE — GLOVE PI ULTRA TOUCH SZ.6.5

## (undated) DEVICE — CURETTE VACURETTE CRVD 8MM

## (undated) DEVICE — D + E SUCTION CANISTER

## (undated) DEVICE — PVC URETHRAL CATHETER: Brand: DOVER

## (undated) DEVICE — UNDER BUTTOCKS DRAPE: Brand: CONVERTORS

## (undated) DEVICE — COLLECTION SET, DISPOSABLE WITH HANDLE AND TAPERED FITTINGS TUBING, 6 FT (183 CM): Brand: GYRUS ACMI

## (undated) DEVICE — DRAPE EQUIPMENT RF WAND

## (undated) DEVICE — GLOVE INDICATOR PI UNDERGLOVE SZ 7 BLUE

## (undated) DEVICE — BETHLEHEM UNIVERSAL MINOR VAG: Brand: CARDINAL HEALTH

## (undated) DEVICE — PREMIUM DRY TRAY LF: Brand: MEDLINE INDUSTRIES, INC.